# Patient Record
Sex: FEMALE | Race: WHITE | NOT HISPANIC OR LATINO | Employment: OTHER | URBAN - METROPOLITAN AREA
[De-identification: names, ages, dates, MRNs, and addresses within clinical notes are randomized per-mention and may not be internally consistent; named-entity substitution may affect disease eponyms.]

---

## 2020-09-05 ENCOUNTER — OFFICE VISIT (OUTPATIENT)
Dept: URGENT CARE | Facility: PHYSICIAN GROUP | Age: 70
End: 2020-09-05
Payer: MEDICARE

## 2020-09-05 ENCOUNTER — HOSPITAL ENCOUNTER (OUTPATIENT)
Dept: RADIOLOGY | Facility: MEDICAL CENTER | Age: 70
End: 2020-09-05
Attending: PHYSICIAN ASSISTANT
Payer: MEDICARE

## 2020-09-05 VITALS
DIASTOLIC BLOOD PRESSURE: 64 MMHG | WEIGHT: 124.4 LBS | SYSTOLIC BLOOD PRESSURE: 106 MMHG | HEIGHT: 65 IN | BODY MASS INDEX: 20.73 KG/M2 | OXYGEN SATURATION: 97 % | RESPIRATION RATE: 18 BRPM | TEMPERATURE: 97.5 F | HEART RATE: 60 BPM

## 2020-09-05 DIAGNOSIS — S69.91XA INJURY OF RIGHT WRIST, INITIAL ENCOUNTER: ICD-10-CM

## 2020-09-05 DIAGNOSIS — M81.0 OSTEOPOROSIS, UNSPECIFIED OSTEOPOROSIS TYPE, UNSPECIFIED PATHOLOGICAL FRACTURE PRESENCE: ICD-10-CM

## 2020-09-05 DIAGNOSIS — S52.571A OTHER CLOSED INTRA-ARTICULAR FRACTURE OF DISTAL END OF RIGHT RADIUS, INITIAL ENCOUNTER: Primary | ICD-10-CM

## 2020-09-05 PROBLEM — M19.90 ARTHRITIS: Status: ACTIVE | Noted: 2019-08-02

## 2020-09-05 PROBLEM — M20.12 ACQUIRED HALLUX VALGUS OF LEFT FOOT: Status: ACTIVE | Noted: 2017-01-25

## 2020-09-05 PROBLEM — Z86.010 HX OF ADENOMATOUS COLONIC POLYPS: Status: ACTIVE | Noted: 2019-01-25

## 2020-09-05 PROBLEM — Z87.891 EX-SMOKER: Status: ACTIVE | Noted: 2019-08-02

## 2020-09-05 PROBLEM — H26.9 CATARACT: Status: ACTIVE | Noted: 2019-08-02

## 2020-09-05 PROBLEM — I49.3 MULTIFOCAL PVCS: Status: ACTIVE | Noted: 2019-08-16

## 2020-09-05 PROBLEM — I49.3 PREMATURE VENTRICULAR BEATS: Status: ACTIVE | Noted: 2019-02-20

## 2020-09-05 PROBLEM — E04.1 THYROID NODULE: Status: ACTIVE | Noted: 2017-07-11

## 2020-09-05 PROCEDURE — 73110 X-RAY EXAM OF WRIST: CPT | Mod: RT

## 2020-09-05 PROCEDURE — 99204 OFFICE O/P NEW MOD 45 MIN: CPT | Performed by: PHYSICIAN ASSISTANT

## 2020-09-05 RX ORDER — ESTRADIOL 2 MG/1
1 RING VAGINAL
COMMUNITY

## 2020-09-05 RX ORDER — MELOXICAM 7.5 MG/1
7.5 TABLET ORAL DAILY
Qty: 14 TAB | Refills: 0 | Status: SHIPPED | OUTPATIENT
Start: 2020-09-05 | End: 2020-09-19

## 2020-09-05 ASSESSMENT — ENCOUNTER SYMPTOMS
MUSCLE WEAKNESS: 0
FEVER: 0
CONSTIPATION: 0
COUGH: 0
VOMITING: 0
TINGLING: 0
CHILLS: 0
SHORTNESS OF BREATH: 0
DIARRHEA: 0
NAUSEA: 0
NUMBNESS: 0
ABDOMINAL PAIN: 0

## 2020-09-05 ASSESSMENT — PAIN SCALES - GENERAL: PAINLEVEL: 7=MODERATE-SEVERE PAIN

## 2020-09-05 NOTE — PATIENT INSTRUCTIONS
Radial Fracture    A radial fracture is a break in the radius bone. The radius is a bone in the forearm, on the same side as the thumb. The forearm is the part of the arm that is between the elbow and the wrist. A radial fracture near the wrist (distal radialfracture) is the most common type of broken arm. A fracture can also occur near the elbow (radial head fracture).  What are the causes?  The most common cause of a radial fracture is falling with the arm outstretched. Other causes include:  · An accident, such as a car or bike accident.  · A hard, direct hit to the forearm.  What increases the risk?  You may be at greater risk for a radial fracture if you:  · Are female.  · Are an older adult.  · Play contact sports.  · Have a condition that causes your bones to become thin and brittle (osteoporosis).  What are the signs or symptoms?  A radial fracture causes pain immediately after the injury. Other signs and symptoms may include:  · An abnormal bend or bump in the arm (deformity).  · Swelling.  · Bruising.  · Numbness or tingling in your arm and hand.  · Limited movement of your arm and hand.  How is this diagnosed?  This condition may be diagnosed based on:  · Your symptoms and medical history.  · A physical exam.  · An X-ray.  How is this treated?  Treatment depends on how severe your fracture is, where it is, and how the pieces of the broken bone line up with each other (alignment).  · The first step may be for you to wear a temporary splint for a few days, until your swelling goes down. After the swelling goes down, you may get a cast, get a different type of splint, or have surgery.  · If your broken bone is in good alignment, you will need to wear a splint or cast for up to 6 weeks.  · If your broken bone is not aligned (is displaced), your health care provider will need to align the bone pieces. After alignment, you will need to wear a splint or cast for up to 6 weeks. To align your broken bone, your  health care provider may:  ? Move the bones back into position without surgery (closed reduction).  ? Perform surgery to align the fracture and fix the bone pieces into place with metal screws, plates, or wires (open reduction and internal fixation, ORIF).  ? Perform surgery to align the fracture and fix the bone pieces into place with pins that are attached to a stabilizing bar outside your skin (external fixation).  Treatment may also include:  · Having your cast changed after 2-3 weeks.  · Physical therapy.  · Follow-up visits and X-rays to make sure you are healing.  Follow these instructions at home:  If you have a splint:  · Wear it as told by your health care provider. Remove it only as told by your health care provider.  · Loosen the splint if your fingers tingle, become numb, or turn cold and blue.  · Keep the splint clean and dry.  If you have a cast:  · Do not stick anything inside the cast to scratch your skin. Doing that increases your risk for infection.  · Check the skin around the cast every day. Tell your health care provider about any concerns.  · You may put lotion on dry skin around the edges of the cast. Do not put lotion on the skin underneath the cast.  · Keep the cast clean and dry.  Bathing  · Do not take baths, swim, or use a hot tub until your health care provider approves. Ask your health care provider if you may take showers. You may only be allowed to take sponge baths.  · If your splint or cast is not waterproof:  ? Do not let it get wet.  ? Cover it with a watertight covering when you take a bath or a shower.  Activity  · Do not lift anything with your injured arm.  · Do not use the injured arm to support your body weight until your health care provider says that you can.  · Ask your health care provider what activities are safe for you during recovery, and ask what activities you need to avoid.  Managing pain, stiffness, and swelling    · If directed, put ice on painful areas:  ? If  you have a removable splint, remove it as told by your health care provider.  ? Put ice in a plastic bag.  ? Place a towel between your skin and the bag, or between your cast and the bag.  ? Leave the ice on for 20 minutes, 2-3 times a day.  · Move your fingers often to avoid stiffness and to lessen swelling.  · Raise (elevate) your arm above the level of your heart while you are sitting or lying down.  General instructions  · Do not put pressure on any part of the cast or splint until it is fully hardened, if applicable. This may take several hours.  · Take over-the-counter and prescription medicines only as told by your health care provider.  · Do not drive until your health care provider approves. You should not drive or use heavy machinery while taking prescription pain medicine.  · Do not use any products that contain nicotine or tobacco, such as cigarettes and e-cigarettes. These can delay bone healing. If you need help quitting, ask your health care provider.  · Keep all follow-up visits as told by your health care provider. This is important.  Contact a health care provider if you have:  · Pain that does not get better with medicine.  · Swelling that gets worse.  · A bad smell coming from your cast.  Get help right away if:  · You cannot move your fingers.  · You have severe pain.  · Your fingers or your hand:  ? Become numb, cold, or pale.  ? Turn a bluish color.  Summary  · A radial fracture is a break in the radius bone. The radius is in the forearm, on the same side as the thumb.  · Treatment depends on how severe your fracture is, where it is, and how the pieces of the broken bone line up with each other.  · A splint or cast may be needed to help the fracture heal. A more severe break may require surgery.  This information is not intended to replace advice given to you by your health care provider. Make sure you discuss any questions you have with your health care provider.  Document Released:  05/31/2007 Document Revised: 12/12/2018 Document Reviewed: 12/12/2018  [employer] Patient Education © 2020 [employer]

## 2020-09-05 NOTE — PROGRESS NOTES
"Subjective:   Cassidy Blanchard is a 69 y.o. female who presents for Wrist Pain ((R) wrist pain after fall today )        Wrist Injury   The incident occurred 1 to 3 hours ago. The incident occurred at home. The injury mechanism was a fall. The pain is present in the right wrist. The quality of the pain is described as aching. The pain does not radiate. The pain is moderate. Pertinent negatives include no muscle weakness, numbness or tingling. The symptoms are aggravated by movement and palpation. She has tried elevation for the symptoms.     Review of Systems   Constitutional: Negative for chills, fever and malaise/fatigue.   Respiratory: Negative for cough and shortness of breath.    Gastrointestinal: Negative for abdominal pain, constipation, diarrhea, nausea and vomiting.   Musculoskeletal: Positive for joint pain (wrist pain).   Neurological: Negative for tingling and numbness.   All other systems reviewed and are negative.      PMH:  has no past medical history on file.  MEDS:   Current Outpatient Medications:   •  METOPROLOL SUCCINATE PO, Take 75 mg by mouth., Disp: , Rfl:   •  estradiol (ESTRING) 2 MG vaginal ring, Insert 1 Each in vagina., Disp: , Rfl:   •  meloxicam (MOBIC) 7.5 MG Tab, Take 1 Tab by mouth every day for 14 days., Disp: 14 Tab, Rfl: 0  ALLERGIES:   Allergies   Allergen Reactions   • Augmentin Diarrhea   • Iodine    • Phenothiazines      Other reaction(s): Seizures, Seizures     SURGHX: History reviewed. No pertinent surgical history.  SOCHX:  reports that she has never smoked. She has never used smokeless tobacco.  History reviewed. No pertinent family history.     Objective:   /64 (BP Location: Left arm, Patient Position: Sitting, BP Cuff Size: Adult)   Pulse 60   Temp 36.4 °C (97.5 °F) (Temporal)   Resp 18   Ht 1.651 m (5' 5\")   Wt 56.4 kg (124 lb 6.4 oz)   SpO2 97%   BMI 20.70 kg/m²     Physical Exam  Vitals signs reviewed.   Constitutional:       General: She is not in " acute distress.     Appearance: She is well-developed.   HENT:      Head: Normocephalic and atraumatic.      Right Ear: External ear normal.      Left Ear: External ear normal.      Nose: Nose normal.      Mouth/Throat:      Mouth: Mucous membranes are moist.   Eyes:      Conjunctiva/sclera: Conjunctivae normal.      Pupils: Pupils are equal, round, and reactive to light.   Neck:      Musculoskeletal: Normal range of motion and neck supple.      Trachea: No tracheal deviation.   Cardiovascular:      Rate and Rhythm: Normal rate and regular rhythm.   Pulmonary:      Effort: Pulmonary effort is normal.      Breath sounds: Normal breath sounds.   Musculoskeletal:      Right wrist: She exhibits decreased range of motion, tenderness and bony tenderness. She exhibits no swelling and no effusion.      Comments: Flexion, extension, abduction, adduction and thumb opposition intact. Distal nv intact.    Skin:     General: Skin is warm and dry.      Capillary Refill: Capillary refill takes less than 2 seconds.   Neurological:      General: No focal deficit present.      Mental Status: She is alert and oriented to person, place, and time.   Psychiatric:         Mood and Affect: Mood normal.         Behavior: Behavior normal.          IMPRESSION:     1.  Minimally displaced fracture of the distal RIGHT radial metaphysis involving the articular surface.  2.  No dislocation.      Assessment/Plan:     1. Other closed intra-articular fracture of distal end of right radius, initial encounter  REFERRAL TO ORTHOPEDICS    meloxicam (MOBIC) 7.5 MG Tab   2. Injury of right wrist, initial encounter  DX-WRIST-COMPLETE 3+ RIGHT   3. Osteoporosis, unspecified osteoporosis type, unspecified pathological fracture presence       Agree with radiology report.  Patient placed in a sugar tong splint. Pt directed RICE and ibuprofen 600-300 mg as needed for pain.  A referral to orthopedics was placed. Patient was given a contingent prescription for  meloxicam to fill and use as directed if symptoms progressed as discussed and agreed upon.  She declines Norco Rx for pain.  Educational handout on distal radius fracture provided.    If symptoms worsen or persist patient can return to clinic for reevaluation.  Red flags and emergency room precautions discussed. All side effects of medication discussed including allergic response, GI upset, tendon injury, etc. Patient confirmed understanding of information.    Please note that this dictation was created using voice recognition software. I have made every reasonable attempt to correct obvious errors, but I expect that there are errors of grammar and possibly content that I did not discover before finalizing the note.

## 2021-01-15 DIAGNOSIS — Z23 NEED FOR VACCINATION: ICD-10-CM
